# Patient Record
Sex: FEMALE | Race: WHITE | URBAN - METROPOLITAN AREA
[De-identification: names, ages, dates, MRNs, and addresses within clinical notes are randomized per-mention and may not be internally consistent; named-entity substitution may affect disease eponyms.]

---

## 2023-02-24 LAB — ESTRADIOL (PG/ML) IN SER/PLAS: 47 PG/ML

## 2023-02-27 LAB — ESTRADIOL (PG/ML) IN SER/PLAS: 118 PG/ML

## 2023-03-01 LAB — ESTRADIOL (PG/ML) IN SER/PLAS: 278 PG/ML

## 2023-03-03 LAB — ESTRADIOL (PG/ML) IN SER/PLAS: 740 PG/ML

## 2023-06-01 LAB
CHLAMYDIA TRACH., AMPLIFIED: NEGATIVE
HEPATITIS B VIRUS SURFACE AG PRESENCE IN SERUM: NONREACTIVE
HEPATITIS C VIRUS AB PRESENCE IN SERUM: NONREACTIVE
HIV 1/ 2 AG/AB SCREEN: NONREACTIVE
N. GONORRHEA, AMPLIFIED: NEGATIVE
SYPHILIS TOTAL AB: NONREACTIVE
THYROTROPIN (MIU/L) IN SER/PLAS BY DETECTION LIMIT <= 0.05 MIU/L: 2.74 MIU/L (ref 0.44–3.98)

## 2023-06-05 LAB — ANTI-MULLERIAN HORMONE (AMH): 1.24 NG/ML

## 2023-08-30 PROBLEM — Z30.9 CONTRACEPTION MANAGEMENT: Status: ACTIVE | Noted: 2023-08-30

## 2023-08-30 PROBLEM — N97.9 FEMALE INFERTILITY: Status: ACTIVE | Noted: 2023-08-30

## 2023-08-30 PROBLEM — J40 BRONCHITIS: Status: ACTIVE | Noted: 2023-08-30

## 2023-08-30 PROBLEM — S16.1XXA CERVICAL STRAIN: Status: ACTIVE | Noted: 2023-08-30

## 2023-08-30 PROBLEM — J32.9 SINUSITIS: Status: ACTIVE | Noted: 2023-08-30

## 2023-08-30 RX ORDER — PROGESTERONE 100 MG/1
100 CAPSULE ORAL 2 TIMES DAILY
COMMUNITY
Start: 2022-09-09 | End: 2023-11-14 | Stop reason: WASHOUT

## 2023-08-30 RX ORDER — CLOMIPHENE CITRATE 50 MG/1
100 TABLET ORAL
COMMUNITY
Start: 2022-09-09 | End: 2023-11-14 | Stop reason: WASHOUT

## 2023-10-02 ENCOUNTER — TELEPHONE (OUTPATIENT)
Dept: ENDOCRINOLOGY | Facility: CLINIC | Age: 43
End: 2023-10-02
Payer: COMMERCIAL

## 2023-10-02 DIAGNOSIS — N97.9 FEMALE INFERTILITY: ICD-10-CM

## 2023-10-03 ENCOUNTER — SPECIALTY PHARMACY (OUTPATIENT)
Dept: PHARMACY | Facility: CLINIC | Age: 43
End: 2023-10-03

## 2023-10-03 ENCOUNTER — PHARMACY VISIT (OUTPATIENT)
Dept: PHARMACY | Facility: CLINIC | Age: 43
End: 2023-10-03
Payer: COMMERCIAL

## 2023-10-04 ENCOUNTER — PHARMACY VISIT (OUTPATIENT)
Dept: PHARMACY | Facility: CLINIC | Age: 43
End: 2023-10-04
Payer: COMMERCIAL

## 2023-10-04 PROCEDURE — RXMED WILLOW AMBULATORY MEDICATION CHARGE

## 2023-10-06 ENCOUNTER — TELEPHONE (OUTPATIENT)
Dept: ENDOCRINOLOGY | Facility: CLINIC | Age: 43
End: 2023-10-06
Payer: COMMERCIAL

## 2023-10-06 NOTE — TELEPHONE ENCOUNTER
Patient started cycle today, full flow, has questions for the nurse about her injectables. Wants to come in today for her baseline if possible.    Spoke to patient regarding CD#1 today 10/6/23. Patient instructed partner needs STDs completed prior to beginning INJ IUI cycle #2. Patient stated she has FSH at home. Patient instructed to schedule baseline follicle scan and E2 for Monday 10/9/23. Patient instructed to call office later this afternoon to schedule baseline once orders have been signed off.   JENNIFFER SEXTON on 10/6/23 at 8:50 AM.

## 2023-10-09 ENCOUNTER — CLINICAL SUPPORT (OUTPATIENT)
Dept: ENDOCRINOLOGY | Facility: CLINIC | Age: 43
End: 2023-10-09
Payer: COMMERCIAL

## 2023-10-09 DIAGNOSIS — N97.9 FEMALE INFERTILITY: ICD-10-CM

## 2023-10-09 LAB — ESTRADIOL SERPL-MCNC: 60 PG/ML

## 2023-10-09 PROCEDURE — 36415 COLL VENOUS BLD VENIPUNCTURE: CPT

## 2023-10-09 PROCEDURE — 82670 ASSAY OF TOTAL ESTRADIOL: CPT

## 2023-10-09 PROCEDURE — 76857 US EXAM PELVIC LIMITED: CPT

## 2023-10-09 NOTE — PROGRESS NOTES
CYCLING NOTE    Here for US and/or lab monitoring; relevant findings reviewed.    Patient stayed for nurse visit. Pain is 0/10  Team will contact patient later today with results and plan.    Jenniffer Beltran  10/09/2023  8:29 AM    HUDDLE PROVIDER NOTE  Ultrasound and/or labs reviewed at [facility].   Results for orders placed or performed in visit on 10/09/23 (from the past 96 hour(s))   Estradiol   Result Value Ref Range    Estradiol 60 pg/mL     Mon 10/9 (Day 1)   follitropin beta injection: Inject 200 Units once daily in the evening under the skin    Tue 10/10 (Day 2)   follitropin beta injection: Inject 200 Units once daily in the evening under the skin    Wed 10/11 (Day 3)   follitropin beta injection: Inject 200 Units once daily in the evening under the skin      RTC in [facility]: three days for Follicle scan and Estradiol.   Hannah Newby    Spoke with patient regarding above plan. Patient instructed to begin  units subcutaneous tonight 10/9/23. Patient instructed to return to clinic 10/12/23 for follicle scan and E2 level. Patient transferred to [facility] to make appointment.   JENNIFFER BELTRAN on 10/9/23 at 1:57 PM.

## 2023-10-11 ENCOUNTER — TELEPHONE (OUTPATIENT)
Dept: ENDOCRINOLOGY | Facility: CLINIC | Age: 43
End: 2023-10-11
Payer: COMMERCIAL

## 2023-10-11 NOTE — TELEPHONE ENCOUNTER
Patient would like a call back - wanted to get her levels from Monday, said her acupuncturist was asking about them    RN returned patient's phone call. Patient wanted to know her E2 level from Monday. E2=60. Patient also wanted to know if she has ever had an FSH, LH, or ferritin levels drawn. RN searched patient's chart and did not see those orders or resulted in her history. Patient verbalized understanding and will call the office with any other questions or concerns.   Juhi Bermeo RN 10/11/23 12:17 PM

## 2023-10-12 ENCOUNTER — SPECIALTY PHARMACY (OUTPATIENT)
Dept: PHARMACY | Facility: CLINIC | Age: 43
End: 2023-10-12

## 2023-10-12 ENCOUNTER — ANCILLARY PROCEDURE (OUTPATIENT)
Dept: ENDOCRINOLOGY | Facility: CLINIC | Age: 43
End: 2023-10-12
Payer: COMMERCIAL

## 2023-10-12 ENCOUNTER — PHARMACY VISIT (OUTPATIENT)
Dept: PHARMACY | Facility: CLINIC | Age: 43
End: 2023-10-12
Payer: COMMERCIAL

## 2023-10-12 DIAGNOSIS — N97.9 FEMALE INFERTILITY: ICD-10-CM

## 2023-10-12 LAB — ESTRADIOL SERPL-MCNC: 182 PG/ML

## 2023-10-12 PROCEDURE — 82670 ASSAY OF TOTAL ESTRADIOL: CPT

## 2023-10-12 PROCEDURE — 36415 COLL VENOUS BLD VENIPUNCTURE: CPT

## 2023-10-12 PROCEDURE — 76857 US EXAM PELVIC LIMITED: CPT

## 2023-10-12 PROCEDURE — 76857 US EXAM PELVIC LIMITED: CPT | Performed by: OBSTETRICS & GYNECOLOGY

## 2023-10-12 PROCEDURE — RXMED WILLOW AMBULATORY MEDICATION CHARGE

## 2023-10-12 RX ORDER — CHORIOGONADOTROPIN ALFA 250 UG/.5ML
250 INJECTION, SOLUTION SUBCUTANEOUS AS NEEDED
Qty: 0.5 ML | Refills: 1 | Status: SHIPPED | OUTPATIENT
Start: 2023-10-12 | End: 2023-11-14 | Stop reason: WASHOUT

## 2023-10-12 NOTE — PROGRESS NOTES
CYCLING NOTE    Here for US and/or lab monitoring; relevant findings reviewed.  Patient stayed for nurse visit. Pain is 0/10  Team will contact patient later today with results and plan.    Juhi Bermeo  10/12/2023  7:38 AM        HUDDLE PROVIDER NOTE  [facility]   Results for orders placed or performed in visit on 10/12/23 (from the past 96 hour(s))   Estradiol   Result Value Ref Range    Estradiol 182 pg/mL     Thu 10/12 (Day 4)   follitropin beta injection: Inject 200 Units once daily in the evening under the skin    Fri 10/13 (Day 5)   follitropin beta injection: Inject 200 Units once daily in the evening under the skin    RTC in [facility]: two days for Follicle scan and Estradiol.     Payam Beaver MD  Reproductive Endocrinology and Infertility   [facility]   [phone] [place]   [phone] [place]    RN called patient with her plan to continue Follistim at 200 units both tonight and tomorrow night. Patient to return to clinic on Saturday for another monitoring appointment. RN reminded patient to order her trigger injection. Patient told RN she will call the pharmacy as soon as she hangs up. Patient verbalized understanding of her plan, and her appointment was scheduled this morning for Saturday.   Juhi Bermeo RN 10/12/23 2:19 PM

## 2023-10-14 ENCOUNTER — APPOINTMENT (OUTPATIENT)
Dept: LAB | Facility: LAB | Age: 43
End: 2023-10-14
Payer: COMMERCIAL

## 2023-10-14 ENCOUNTER — ANCILLARY PROCEDURE (OUTPATIENT)
Dept: ENDOCRINOLOGY | Facility: CLINIC | Age: 43
End: 2023-10-14

## 2023-10-14 DIAGNOSIS — N97.9 FEMALE INFERTILITY: ICD-10-CM

## 2023-10-14 LAB — ESTRADIOL SERPL-MCNC: 459 PG/ML

## 2023-10-14 PROCEDURE — 36415 COLL VENOUS BLD VENIPUNCTURE: CPT

## 2023-10-14 PROCEDURE — 76857 US EXAM PELVIC LIMITED: CPT

## 2023-10-14 PROCEDURE — 82670 ASSAY OF TOTAL ESTRADIOL: CPT

## 2023-10-14 NOTE — PROGRESS NOTES
CYCLING NOTE    Here for US and/or lab monitoring; relevant findings reviewed.    Patient stayed for nurse visit. Pain is 0/10  Team will contact patient later today with results and plan.    Brenna Beltran  10/14/2023  8:29 AM    HUDDLE PROVIDER NOTE  Ultrasound and/or labs reviewed at huddle.   Results for orders placed or performed in visit on 10/14/23 (from the past 96 hour(s))   Estradiol   Result Value Ref Range    Estradiol 459 pg/mL     Sat 10/14 (Day 6)   follitropin beta injection: Inject 200 Units once daily in the evening under the skin    Sun 10/15 (Day 7)   follitropin beta injection: Inject 200 Units once daily in the evening under the skin    RTC in two days for Follicle scan and Estradiol and Progesterone  Patient contacted with plan by fellow Rissa.   Hannah Newby

## 2023-10-16 ENCOUNTER — TELEPHONE (OUTPATIENT)
Dept: ENDOCRINOLOGY | Facility: CLINIC | Age: 43
End: 2023-10-16

## 2023-10-16 ENCOUNTER — ANCILLARY PROCEDURE (OUTPATIENT)
Dept: ENDOCRINOLOGY | Facility: CLINIC | Age: 43
End: 2023-10-16
Payer: COMMERCIAL

## 2023-10-16 DIAGNOSIS — N97.9 FEMALE INFERTILITY: ICD-10-CM

## 2023-10-16 LAB
ESTRADIOL SERPL-MCNC: 222 PG/ML
LH SERPL-ACNC: 22.5 IU/L
PROGEST SERPL-MCNC: 1.8 NG/ML

## 2023-10-16 PROCEDURE — 36415 COLL VENOUS BLD VENIPUNCTURE: CPT

## 2023-10-16 PROCEDURE — 76857 US EXAM PELVIC LIMITED: CPT

## 2023-10-16 PROCEDURE — 82670 ASSAY OF TOTAL ESTRADIOL: CPT

## 2023-10-16 PROCEDURE — 84144 ASSAY OF PROGESTERONE: CPT

## 2023-10-16 PROCEDURE — 76857 US EXAM PELVIC LIMITED: CPT | Performed by: OBSTETRICS & GYNECOLOGY

## 2023-10-16 PROCEDURE — 83002 ASSAY OF GONADOTROPIN (LH): CPT

## 2023-10-16 NOTE — PROGRESS NOTES
CYCLING NOTE  Here for US and/or lab monitoring; relevant findings reviewed.  Patient stayed for nurse visit. Pain is 0/10 Patient reports she has her trigger at home.   Team will contact patient later today with results and plan.    Juhi Bermeo  10/16/2023  3:10 PM      HUDDLE PROVIDER NOTE  Ultrasound and/or labs reviewed at [facility].   Results for orders placed or performed in visit on 10/16/23 (from the past 96 hour(s))   Estradiol   Result Value Ref Range    Estradiol 222 pg/mL   Progesterone   Result Value Ref Range    Progesterone 1.8 ng/mL   Luteinizing Hormone (LH)   Result Value Ref Range    Luteinizing Hormone 22.5 IU/L     Mon 10/16 (Day 8)   Ovidrel syringe: Inject 250 mcg if needed under the skin    Can add LH today stat and then decide about trigger today versus tomorrow.   Reviewed LH and will trigger today and do IUI tomorrow.   Hannah Newby     RN called patient with her plan. RN reviewed her LH level and why it was drawn. Patient will trigger with Ovidrel as soon as she can. Patient will come to office tomorrow for her IUI. Patient verbalized understanding of her plan. RN transferred patient to the  to schedule her IUI.   Juhi Bermeo RN 10/16/23 3:36 PM

## 2023-10-17 ENCOUNTER — PROCEDURE VISIT (OUTPATIENT)
Dept: ENDOCRINOLOGY | Facility: CLINIC | Age: 43
End: 2023-10-17

## 2023-10-17 DIAGNOSIS — N97.9 FEMALE INFERTILITY: ICD-10-CM

## 2023-10-17 PROCEDURE — 58322 ARTIFICIAL INSEMINATION: CPT | Performed by: NURSE PRACTITIONER

## 2023-10-17 NOTE — PROGRESS NOTES
"Patient ID: Tanya Gaffney is a 42 y.o. female.    Intrauterine Insemination (IUI)    Date/Time: 10/17/2023 9:16 AM    Performed by: MAXIM Murillo  Authorized by: MAXIM Mccloud    Consent:     Consent obtained:  Verbal and written    Consent given by:  Patient    Procedure risks and benefits discussed: yes      Patient questions answered: yes      Patient agrees, verbalizes understanding, and wants to proceed: yes      Educational handouts given: yes      Instructions and paperwork completed: yes    Procedure:     Specimen source: partner      Specimen condition: fresh      Pelvic exam performed: yes      Speculum placed in vagina: yes      Tenaculum applied to cervix: no      Type of insemination: intrauterine insemination      Ultrasound guidance: No      Speculum type: Cristhian      Catheter type: curved      Curvature: mild      Difficulty: easy      Estimated Blood Loss:  None    Specimen Return: none    Post-procedure:     Patient tolerated procedure well: yes      Post-procedure plan: patient counseled on signs and symptoms for which to call and/or return to clinic    Comments:     Procedure comments:  IUI Procedure note:    The patient presented today for IUI.     Consent signed by patient, IUI sample identified by patient, and Final Verification performed with patient via electronic system \"\" prior to insemination.   All patient's questions were discussed and answered.          Chaperone offered to patient for intimate exam: Patient declined chaperone  Name of chaperone: N/A    Specimen Source: Partner  Specimen Condition: Fresh  TMS 5 million    Additional notes:    Patient was advised to call office if she develops fever, chills, pelvic pain, or heavy bleeding.    Progesterone and post-IUI teaching completed. Will start Progesterone three days after IUI and continue twice daily. Pt will do a home pregnancy test two weeks from IUI date. If home pregnancy test positive, patient " will continue Progesterone and call office to schedule BHCG. If home pregnancy test negative, patient will discontinue Progesterone, and was advised to call office with start of menses; will proceed with another cycle if appropriate.  Patient verbalized understanding of plan.    Attending Attestation: I was physically present for key and critical portions performed by the fellow. I reviewed the fellow's documentation and discussed the patient with the fellow. I agree with the fellow's medical decision making as documented in the fellow's note.

## 2023-10-27 ENCOUNTER — TELEMEDICINE (OUTPATIENT)
Dept: ENDOCRINOLOGY | Facility: CLINIC | Age: 43
End: 2023-10-27
Payer: COMMERCIAL

## 2023-10-27 DIAGNOSIS — E03.8 SUBCLINICAL HYPOTHYROIDISM: Primary | ICD-10-CM

## 2023-10-27 PROCEDURE — 99215 OFFICE O/P EST HI 40 MIN: CPT | Performed by: OBSTETRICS & GYNECOLOGY

## 2023-10-27 NOTE — PATIENT INSTRUCTIONS
COUNSELING  We discussed the impact of age on fertility. We discussed that a woman is born with all of the follicles that she will have in her lifetime and that these numbers progressively decrease as the patient reaches menopause. We discussed that women can remain fertile into their late 30’s and even early 40’s, however, chance for success is significantly lower for women who have infertility. We also discussed the higher rates of aneuploidy and miscarriage that occur as women age.    Discussed improved AMH- reassuring that patient may respond to stimulation.  However, age is still primary factor related to IVF prognosis.  Success rate with autologous eggs still expected to be <4%    We again discussed donor egg as an alternative- patient would like to proceed with autologous eggs for now.     Routine Testing  Fertility Center  STDs Within 1 year   Genetic carrier Waiver/Completed   T&S Within 1 year   AMH Within 1 year   TSH Within 1 year   Rubella/Varicella Within 5 years     PLAN  Orders Placed This Encounter   Procedures    TSH with reflex to Free T4 if abnormal       ART Cycle Plan    1. Protocol:  Lead in: estrace  Stimulation protocol: Microdose Flare /Menopur 150  Trigger plan: HCG  Pre-retrieval meds: Antibiotics per protocol  Adjuncts: None  Notes:     2. FET:  Protocol: Programmed Oral estrace and IM P4 75 mg  Adjuncts:  ASA 81 mg  Notes:     3. Insemination:  Sperm source: partner  Sperm collection method: Fresh with Frozen Backup  Notes:  ICSI: Yes  # of oocytes to be fertilized: all    4. Transfer:   Number of embryos to replace: 1 euploid (vs. 3 untested)  Stage of embryo transfer: day 5  Trial transfer needed? No    5. Cryopreservation plan  PGT: PGTA   Freeze all? Yes  Oocyte cryopreservation: No    6. Patient willing to accept blood transfusion: Yes    7. RN to review chart, initiate IVF boarding pass, and assure completion of the following prior to proceeding with IVF stimulation:        Orders Placed This Encounter   Procedures    TSH with reflex to Free T4 if abnormal       STDs (Hepatitis B, Hepatitis C, HIV, Syphilis, GC/CT) for patient and partner (if applicable) to be completed within the last year (z11.3)  Genetic carrier testing: waiver or carrier screen completed with clearance documentation by provider for both patient and partner (z13.71)  Rubella and varicella to be completed within the last five years (z11.59)   TSH to be completed within the last year (z13.29)--> will repeat now, start synthroid 25 mcg if TSH >2.5  Type & Screen to be completed within the last year (z01.83)  AMH to be completed within the last year (z31.41)  Pre-IVF Imaging: Reference any orders placed by provider  Frozen sperm sample: ensure frozen partner sample (z31.41) or verify donor sperm on site prior to stimulation start date.  Verify in EMR or obtain copy of patient’s last mammogram (if applicable) and pap smear results for provider review in boarding pass.  Enroll in Engaged MD and complete annual consent forms for IVF and cryotransport agreements.  BMI checklist for BMI <18 or >40  Consults: Nursing and Financial Consult  Additional consults Financial consult and review what is in the boarding pass.    Margarita Lora MD

## 2023-10-27 NOTE — PROGRESS NOTES
Virtual Visit    Follow Up Visit HPI    Patient is a 42 y.o. No obstetric history on file. 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     Interval history- has made many lifestyle and diet changes (no sugar, no chemicals, whole foods) and has been taking supplements including CoQ10.     Testing to date:   Result Date Done   Type and Screen: No results found for requested labs within last 365 days. No results found for requested labs within last 365 days.   Rh: No results found for requested labs within last 365 days. No results found for requested labs within last 365 days.   Antibody: No results found for requested labs within last 365 days.  No results found for requested labs within last 365 days.   Rubella: No results found for requested labs within last 365 days. 2019   Varicella: No results found for requested labs within last 365 days. 2019   TSH: 2.74 (Ref range: 0.44 - 3.98 mIU/L) 2023   AMH: 1.236 (Ref range: <=6.282 ng/mL) 2023   Hepatitis B surface antigen: NONREACTIVE (Ref range: NONREACTIVE) 2023   Hepatitis C antibody: NONREACTIVE (Ref range: NONREACTIVE) 2023   HIV ½ Antigen Antibody screen with reflex: NONREACTIVE (Ref range: NONREACTIVE) 2023   Syphilis screening with reflex: No results found for requested labs within last 365 days. 2023       IVF Consult reviewed and updated     Patient is a 42 year-old who presents for IVF consult.     :     OB Hx: None     Indication for IVF: ALVARO, AMA     Length of infertility: 3.5 years     Ovarian reserve testing:     AMH: 0.6 ()--> 1.3 2023  AFC: R few, L5 ()--> 4 R, 4 L      Status of fallopian tubes: Patent on HSG 2019     Uterine Cavity Evaluation: HSG shows possible filing defect at fundus (images reviewed); otherwise none  2023- S/p Hysteroscopy polypectomy - Two broad base polyps - one on the anterior wall and one posterior wall. Bilateral ostia  visualized. Otherwise normal cavity contour.       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 LINDY aguilar   Recent IUI sample-  2.1 cc  225 mil/mL  36% motility  TMC=171 million     Past Medical History:   Diagnosis Date    Other specified health status 2019    No pertinent past medical history     History reviewed. No pertinent surgical history.  Current Outpatient Medications on File Prior to Visit   Medication Sig Dispense Refill    choriogonadotropin tam (Ovidrel) 250 mcg/0.5 mL injection Inject 250 mcg (1 syringe) under the skin as a one time dose, as directed per provider for trigger. 0.5 mL 1    clomiPHENE (Clomid) 50 mg tablet Take 2 tablets (100 mg) by mouth once daily. Cycle days 3-7      multivit-min/ferrous fumarate (MULTI VITAMIN ORAL) Take by mouth.      progesterone (Prometrium) 100 mg capsule Take 1 capsule (100 mg) by mouth twice a day. INSERT VAGINALLY STARTING 3 DAYS AFTER INSEMINATION       No current facility-administered medications on file prior to visit.       BMI:   BMI Readings from Last 1 Encounters:   23 19.48 kg/m²     VITALS:  There were no vitals taken for this visit.  LMP: No LMP recorded.    ASSESSMENT   42 y.o. No obstetric history on file. 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    COUNSELING  We discussed the impact of age on fertility. We discussed that a woman is born with all of the follicles that she will have in her lifetime and that these numbers progressively decrease as the patient reaches menopause. We discussed that women can remain fertile into their late 30’s and even early 40’s, however, chance for success is significantly lower for women who have infertility. We also discussed the higher rates of aneuploidy and miscarriage that occur as women age.    Discussed improved AMH- reassuring that patient may respond to stimulation.  However, age is still primary factor related to IVF prognosis.  Success rate with autologous eggs still expected to be <4%    We again discussed donor egg as an alternative- patient would like to proceed with autologous eggs for now.     Routine Testing  Fertility Center  STDs Within 1 year   Genetic carrier Waiver/Completed   T&S Within 1 year   AMH Within 1 year   TSH Within 1 year   Rubella/Varicella Within 5 years     PLAN  Orders Placed This Encounter   Procedures    TSH with reflex to Free T4 if abnormal       ART Cycle Plan    1. Protocol:  Lead in: estrace  Stimulation protocol: Microdose Flare /Menopur 150  Trigger plan: HCG  Pre-retrieval meds: Antibiotics per protocol  Adjuncts:  None  Notes:     2. FET:  Protocol: Programmed Oral estrace and IM P4 75 mg  Adjuncts:  ASA 81 mg  Notes:     3. Insemination:  Sperm source: partner  Sperm collection method: Fresh with Frozen Backup  Notes:  ICSI: Yes  # of oocytes to be fertilized: all    4. Transfer:   Number of embryos to replace: 1 euploid (vs. 3 untested)  Stage of embryo transfer: day 5  Trial transfer needed? No    5. Cryopreservation plan  PGT: PGTA   Freeze all? Yes  Oocyte cryopreservation: No    6. Patient willing to accept blood transfusion: Yes    7. RN to review chart, initiate IVF  boarding pass, and assure completion of the following prior to proceeding with IVF stimulation:       Orders Placed This Encounter   Procedures    TSH with reflex to Free T4 if abnormal       STDs (Hepatitis B, Hepatitis C, HIV, Syphilis, GC/CT) for patient and partner (if applicable) to be completed within the last year (z11.3)  Genetic carrier testing: waiver or carrier screen completed with clearance documentation by provider for both patient and partner (z13.71)  Rubella and varicella to be completed within the last five years (z11.59)   TSH to be completed within the last year (z13.29)--> will repeat now, start synthroid 25 mcg if TSH >2.5  Type & Screen to be completed within the last year (z01.83)  AMH to be completed within the last year (z31.41)  Pre-IVF Imaging: Reference any orders placed by provider  Frozen sperm sample: ensure frozen partner sample (z31.41) or verify donor sperm on site prior to stimulation start date.  Verify in EMR or obtain copy of patient’s last mammogram (if applicable) and pap smear results for provider review in boarding pass.  Enroll in Engaged MD and complete annual consent forms for IVF and cryotransport agreements.  BMI checklist for BMI <18 or >40  Consults: Nursing and Financial Consult  Additional consults Financial consult and review what is in the boarding pass.    Margarita Lora MD

## 2023-10-31 ENCOUNTER — TELEPHONE (OUTPATIENT)
Dept: ENDOCRINOLOGY | Facility: CLINIC | Age: 43
End: 2023-10-31
Payer: COMMERCIAL

## 2023-10-31 DIAGNOSIS — Z31.83 ENCOUNTER FOR ASSISTED REPRODUCTIVE FERTILITY CYCLE: ICD-10-CM

## 2023-10-31 DIAGNOSIS — Z13.1 SCREENING FOR DIABETES MELLITUS: ICD-10-CM

## 2023-10-31 DIAGNOSIS — N97.9 FEMALE INFERTILITY: ICD-10-CM

## 2023-10-31 DIAGNOSIS — Z01.812 PRE-PROCEDURE LAB EXAM: ICD-10-CM

## 2023-10-31 DIAGNOSIS — Z01.83 ENCOUNTER FOR RH BLOOD TYPING: ICD-10-CM

## 2023-10-31 NOTE — PROGRESS NOTES
Boarding Pass IVF Checklist    Age: 42 y.o.  No obstetric history on file.    Provider: Margarita Lora MD  Primary RN: Brenna Beltran  Reasons for Treatment: Diminished Ovarian Reserve and AMA  Last BMI  23 : 19.48 kg/m²       Past Medical History:   Diagnosis Date    Other specified health status 2019    No pertinent past medical history     Ectopic Risk: N    Date Done Consultation Results/Comments   *** Medication Protocol Lead in: {AVELINA LEAD IN:23640}  Stimulation protocol: ***  Trigger plan: {AVELINA TRIGGER PLAN:42596}  Pre-retrieval meds: Antibiotics per protocol  Adjuncts: {AVELINA ADJUNCTS:23371}  Notes: ***   10/27/23 IVF Consult   IVF Consult: Yes  PGT-A/M? Yes; Req Sent: {AVELINA YES/NO:11485}; PGT-M Test Ready: {AVELINA YES/NO:}; Company: [employer]    Emailed 23 ES   *** IVF Consent Form Enroll in EngagedMD: Yes (Brenna Beltran RN)  Received and in chart: {AVELINA Yes (Name):16196}   *** UH Waiver (out) Form Enroll in EngagedMD: {AVELINA Yes (Name):05434}  Received and in chart: {AVELINA Yes (Name):51606}   *** [employer] Transfer Authorization Form Enroll in EngagedMD: Yes (Brenna Beltran RN)  Received and in chart: {AVELINA Yes (Name) N/A:81120}    Insurance Procedure Order Placed? N/A  Pended    *** Financial Consult {AVELINA YES/NO:04726}   *** PAT Questionnaire  {Anesthesia consult?:68353}   *** Nursing Teaching: {AVELINA Yes (Name):26762}  Cycle Calendar: {AVELINA Yes (Name):12375}  SART: {AVELINA Yes (Name):83916}   *** Genetic Carrier Screening Clearance {AVELINA Y or N:50255}    MFM Consult Okay to proceed? N/A   N/A Psych Consult Okay to proceed? N/A   N/A Genetics Consult Okay to proceed? N/A    Other    Date Done Female Labs Results/Comments   11/3/2023 T&S (Q 1 Year) ABO: O  Rh: POS  Antibody: No results found for requested labs within last 365 days.   2023 Hep B sAg NONREACTIVE   2023 Hep C AB NONREACTIVE   2023 HIV NONREACTIVE   2023 Syphilis NONREACTIVE   2023 GC/CT GC:  NEGATIVE  CT: NEGATIVE   12/6/2019 Rubella (Q 5 Years) 137.0   12/6/2019 Varicella (Q 5 Years) POSITIVE   11/3/2023 TSH 1.74 (Ref range: 0.44 - 3.98 mIU/L)   6/14/2022 HgbA1C 4.7   6/1/2023 AMH 1.236   12/6/2019 Carrier Screening Myriad 2bP: N/A  Authorization completed  Pos Mucopolysaccharidosis Type IIIA    Uterine Cavity Eval HSG: N/A    SIS: N/A    Hyster: (N/A)     N/A Trial Transfer Needs at retrieval? {AVELINA YES/NO:20759}  Easy IUIs: {AVELINA YES/NO:20759}   6/15/23 Pap Smear (Q 5 Years) WNL   HPV: N/A    Mammogram ( > 40) (Q 1 Year)           Date Done Male Labs  FIOR GEIGER (MRN: [id number]) Results/Comments   10/6/2023 Hep B sAg Nonreactive   10/6/2023 Hep C AB  Nonreactive   10/6/2023 HIV Nonreactive   10/6/2023 Syphilis Nonreactive   10/6/2023 GC/CT GC: Negative  CT: Negative   6/23/2022 Carrier Screening N/A  Authorization completed  Pos Phenylalanine Hydroxylase Deficiency and pendred syndrome    N/A Semen Analysis  Volume(mL): N/A  Count(million): N/A  Motility(%): N/A  Motile Count(million): N/A   *** Sperm Freeze  # of vials: N/A  TMS post thaw: N/A   Date Done Miscellaneous Results/Comments   N/A BMI Checklist  BMI > 40 or < 18 Added to chart:   No   N/A >= 45 Checklist  Added to chart:   No   **Does not need to be completed prior to placing on IVF calendar**      Boarding Pass 45 and Older Checklist    Date Done Testing Results   No results found for requested labs within last 365 days. CBC Plt: No results found for requested labs within last 365 days.  Hct: No results found for requested labs within last 365 days.   No results found for requested labs within last 365 days. CMP BUN: No results found for requested labs within last 365 days.  Cre: No results found for requested labs within last 365 days.  AST: No results found for requested labs within last 365 days.  ALT: No results found for requested labs within last 365 days.   No results found for requested labs within last 365 days. Lipid Panel  Cholesterol: No results found for requested labs within last 365 days.  HDL: No results found for requested labs within last 365 days.  Cholesterol/HDL Ratio: No results found for requested labs within last 365 days.  LDL: No results found for requested labs within last 365 days.  VLDL: No results found for requested labs within last 365 days.  Triglycerides: No results found for requested labs within last 365 days.  Non HDL Cholesterol: No results found for requested labs within last 365 days.   No results found for requested labs within last 365 days. HgbA1C No results found for requested labs within last 365 days.   11/3/2023 TSH (with reflex to T4) TSH: 1.74 (Ref range: 0.44 - 3.98 mIU/L)  T4: No results found for requested labs within last 365 days.   N/A EKG N/A   N/A Mammogram ( > 40) (Q 1 Year) N/A  {AVELINA Declined - provider documentation:44925}   *** MFM Clearance Clearance Letter-Provider Reviewed: ***   *** PCP/Internal Medicine Clearance (if required) Clearance Letter-Provider Reviewed: ***   N/A Colonoscopy No results found for this or any previous visit.   *** Transfer of Care (when pregnant) {Transfer of Care (when pregnant):11020}

## 2023-11-01 NOTE — PROGRESS NOTES
Spoke with patient regarding IVF checklist. Patient would like additional labs drawn such as, LH, E2, FSH, Ferritin, Vit-D and B12 levels drawn. Patient instructed this RN will contact Dr. Lora regarding additional testing and >45 checklist items. Patient stated she has a mammogram coming up. Patient instructed to call office tomorrow to schedule sperm freeze. Patient instructed to complete PAT questionnaire and engaged MD forms.   JENNIFFER SEXTON on 11/1/23 at 2:09 PM.

## 2023-11-01 NOTE — TELEPHONE ENCOUNTER
Attempted to return patient call regarding IVF checklist. Detialed VM left for patient. Patient instructed she will need a repeat TSH and an updated type and screen. Patient instructed she will need and updated mammogram. Patient instructed partner will need semen freeze. Patient instructed forms/consents were sent via engaged MD. Patient instructed PAT form was sent via my chart. Patient instructed we have a lab closure around the holidays and the last day for a fresh IVF start would be 11/25/23. Patient instructed we will most likely be looking at an IVF start in the new year and patient will have a birthday before that and will be turning 43 and will need additional checklist items completed. Patient instructed to call office with questions/concerns.   JENNIFFER SEXTON on 11/1/23 at 11:11 AM.

## 2023-11-02 DIAGNOSIS — Z31.69 ENCOUNTER FOR PRECONCEPTION CONSULTATION: ICD-10-CM

## 2023-11-02 DIAGNOSIS — E56.9 VITAMIN DEFICIENCY: Primary | ICD-10-CM

## 2023-11-02 DIAGNOSIS — Z31.41 FERTILITY TESTING: ICD-10-CM

## 2023-11-03 ENCOUNTER — LAB (OUTPATIENT)
Dept: LAB | Facility: LAB | Age: 43
End: 2023-11-03
Payer: COMMERCIAL

## 2023-11-03 ENCOUNTER — APPOINTMENT (OUTPATIENT)
Dept: LAB | Facility: LAB | Age: 43
End: 2023-11-03
Payer: COMMERCIAL

## 2023-11-03 DIAGNOSIS — Z01.83 ENCOUNTER FOR RH BLOOD TYPING: ICD-10-CM

## 2023-11-03 DIAGNOSIS — Z31.41 FERTILITY TESTING: ICD-10-CM

## 2023-11-03 DIAGNOSIS — E56.9 VITAMIN DEFICIENCY: ICD-10-CM

## 2023-11-03 DIAGNOSIS — E03.8 SUBCLINICAL HYPOTHYROIDISM: ICD-10-CM

## 2023-11-03 LAB
25(OH)D3 SERPL-MCNC: 62 NG/ML (ref 30–100)
ABO GROUP (TYPE) IN BLOOD: NORMAL
ANTIBODY SCREEN: NORMAL
ESTRADIOL SERPL-MCNC: 37 PG/ML
FERRITIN SERPL-MCNC: 151 NG/ML (ref 8–150)
FSH SERPL-ACNC: 9.6 IU/L
LH SERPL-ACNC: 12.4 IU/L
RH FACTOR (ANTIGEN D): NORMAL
TSH SERPL-ACNC: 1.74 MIU/L (ref 0.44–3.98)
VIT B12 SERPL-MCNC: 807 PG/ML (ref 211–911)

## 2023-11-03 PROCEDURE — 86901 BLOOD TYPING SEROLOGIC RH(D): CPT

## 2023-11-03 PROCEDURE — 82306 VITAMIN D 25 HYDROXY: CPT

## 2023-11-03 PROCEDURE — 86900 BLOOD TYPING SEROLOGIC ABO: CPT

## 2023-11-03 PROCEDURE — 82728 ASSAY OF FERRITIN: CPT

## 2023-11-03 PROCEDURE — 84443 ASSAY THYROID STIM HORMONE: CPT

## 2023-11-03 PROCEDURE — 82670 ASSAY OF TOTAL ESTRADIOL: CPT

## 2023-11-03 PROCEDURE — 83002 ASSAY OF GONADOTROPIN (LH): CPT

## 2023-11-03 PROCEDURE — 82607 VITAMIN B-12: CPT

## 2023-11-03 PROCEDURE — 36415 COLL VENOUS BLD VENIPUNCTURE: CPT

## 2023-11-03 PROCEDURE — 86850 RBC ANTIBODY SCREEN: CPT

## 2023-11-03 PROCEDURE — 83001 ASSAY OF GONADOTROPIN (FSH): CPT

## 2023-11-03 NOTE — PROGRESS NOTES
Attempted to reach patient regarding additional lab work and testing. Detailed VM left for patient. Patient instructed additional lab work has been ordered. Patient instructed EKG was ordered. Patient instructed MFM consult place. Patient instructed to reach out to PCP for clearance. Patient instructed per Dr. Lora she can decline colonscopy if she wishes.   JENNIFFER SEXTON on 11/3/23 at 8:05 AM.

## 2023-11-12 PROBLEM — Z01.419 WELL FEMALE EXAM WITH ROUTINE GYNECOLOGICAL EXAM: Status: ACTIVE | Noted: 2023-08-30

## 2023-11-12 PROBLEM — Z12.31 VISIT FOR SCREENING MAMMOGRAM: Status: ACTIVE | Noted: 2023-08-30

## 2023-11-12 PROBLEM — N60.01 BREAST CYST, RIGHT: Status: ACTIVE | Noted: 2023-11-12

## 2023-11-12 NOTE — PROGRESS NOTES
"Annual  Subjective   Tanya Gaffney is a 42 y.o. female, last seen 06/2022, who is here for a routine exam.  Interval med hx: melanoma removed from underside of left breast, stitches still in place. Recently completed IUI in Oct 2023, not resulting in pregnancy. Targeting to begin IVF in Jan. 2024. Recent lab work has shown fluctuating TSH and high ferritin levels, so Dr. Lora is going to repeat blood work.   Complaints: none at this time  Last pap 06/2022 neg/hpv neg   Mammogram 06/22 abnl R screen; dx =12mm cyst w 11/22 rpt 10mm cyst=resume routine   Birth control Pt is trying to conceive and is seeing a fertility Specialist ( Dr. Margarita Chino ).   Periods : every month, NORMAL BLOOD LOSS.   Menarche [age].   Date of Last Period STDs none.   Sexual activity currently sexually active;same long term partner; no exposure concerns.   Total pregnancies  0.    Review of Systems   Constitutional:  Negative for activity change, fatigue and unexpected weight change.   Respiratory:  Negative for chest tightness and shortness of breath.    Cardiovascular:  Negative for chest pain and leg swelling.   Gastrointestinal:  Negative for abdominal distention and abdominal pain.   Genitourinary:  Negative for difficulty urinating, dysuria, genital sores, pelvic pain, vaginal bleeding, vaginal discharge and vaginal pain.   Musculoskeletal:  Negative for gait problem and joint swelling.   Skin:  Negative for color change and rash.   Neurological:  Negative for dizziness, weakness and headaches.   Hematological:  Negative for adenopathy.   Objective  Visit Vitals  /62   Ht 1.651 m (5' 5\")   Wt 55.8 kg (123 lb)   LMP 11/01/2023   BMI 20.47 kg/m²   OB Status Having periods   Smoking Status Never   BSA 1.6 m²       General:   Alert and oriented, in no acute distress   Neck: Supple. No visible thyromegaly.    Breast/Axilla: Normal to palpation bilaterally without masses, skin changes, or nipple discharge. Tissue very dense; " bandage underside L breast remains in place at site melanoma excison.   Abdomen: Soft, non-tender, without masses or organomegaly   Vulva: Normal architecture without erythema, masses, or lesions.    Vagina: Normal mucosa without lesions, masses, or atrophy. No abnormal vaginal discharge.    Cervix: Normal without masses, lesions, or signs of cervicitis   Uterus: Normal, mobile, non-enlarged uterus   Adnexa: No palp masses or tenderness   Pelvic Floor normal   Psych Normal affect. Normal mood.      Assessment/Plan   Encounter Diagnoses   Name Primary?    Well female exam with routine gynecological exam; grossly nl breast and gyn exams. Yes    Visit for screening mammogram; will sched after left breast heals from lesion excision     Breast cyst, right; stable on serial scans; resume routine per prad     Female infertility; IVF planned to start 01/01/2024.      Buffy Bojorquez MD

## 2023-11-13 ASSESSMENT — ENCOUNTER SYMPTOMS
CHEST TIGHTNESS: 0
ABDOMINAL PAIN: 0
WEAKNESS: 0
FATIGUE: 0
HEADACHES: 0
ADENOPATHY: 0
COLOR CHANGE: 0
UNEXPECTED WEIGHT CHANGE: 0
SHORTNESS OF BREATH: 0
JOINT SWELLING: 0
DYSURIA: 0
DIFFICULTY URINATING: 0
ACTIVITY CHANGE: 0
ABDOMINAL DISTENTION: 0
DIZZINESS: 0

## 2023-11-14 ENCOUNTER — TELEPHONE (OUTPATIENT)
Dept: ENDOCRINOLOGY | Facility: CLINIC | Age: 43
End: 2023-11-14
Payer: COMMERCIAL

## 2023-11-14 ENCOUNTER — OFFICE VISIT (OUTPATIENT)
Dept: OBSTETRICS AND GYNECOLOGY | Facility: CLINIC | Age: 43
End: 2023-11-14
Payer: COMMERCIAL

## 2023-11-14 VITALS
DIASTOLIC BLOOD PRESSURE: 62 MMHG | BODY MASS INDEX: 20.49 KG/M2 | HEIGHT: 65 IN | SYSTOLIC BLOOD PRESSURE: 100 MMHG | WEIGHT: 123 LBS

## 2023-11-14 DIAGNOSIS — Z12.31 VISIT FOR SCREENING MAMMOGRAM: ICD-10-CM

## 2023-11-14 DIAGNOSIS — N60.01 BREAST CYST, RIGHT: ICD-10-CM

## 2023-11-14 DIAGNOSIS — N97.9 FEMALE INFERTILITY: ICD-10-CM

## 2023-11-14 DIAGNOSIS — Z01.419 WELL FEMALE EXAM WITH ROUTINE GYNECOLOGICAL EXAM: Primary | ICD-10-CM

## 2023-11-14 PROCEDURE — 1036F TOBACCO NON-USER: CPT | Performed by: OBSTETRICS & GYNECOLOGY

## 2023-11-14 PROCEDURE — 99396 PREV VISIT EST AGE 40-64: CPT | Performed by: OBSTETRICS & GYNECOLOGY

## 2023-11-14 ASSESSMENT — PATIENT HEALTH QUESTIONNAIRE - PHQ9
1. LITTLE INTEREST OR PLEASURE IN DOING THINGS: NOT AT ALL
2. FEELING DOWN, DEPRESSED OR HOPELESS: NOT AT ALL
SUM OF ALL RESPONSES TO PHQ9 QUESTIONS 1 & 2: 0

## 2023-11-14 ASSESSMENT — LIFESTYLE VARIABLES
SKIP TO QUESTIONS 9-10: 1
HOW OFTEN DO YOU HAVE SIX OR MORE DRINKS ON ONE OCCASION: NEVER
HOW MANY STANDARD DRINKS CONTAINING ALCOHOL DO YOU HAVE ON A TYPICAL DAY: 1 OR 2
AUDIT-C TOTAL SCORE: 1
HOW OFTEN DO YOU HAVE A DRINK CONTAINING ALCOHOL: MONTHLY OR LESS

## 2023-11-14 NOTE — TELEPHONE ENCOUNTER
Returned patient call regarding reviewing lab work. Patient instructed that additional testing was not ordered (CMP, CBC, Lipid panel, hemoglobin A1C) and she will need to have these levels drawn. Patient instructed this RN will send message to Dr. Lora regarding review of lab work as patient is concerned about Ferritin level and is wondering if Dr. Lora would recommend additional testing such as a liver panel. Patient is also concerned about TSH and would like to know if she should have additional testing for hashimoto's and start thyroid medication. Patient instructed that in the fertility world we like the level to be below 2.5. Patient instructed this RN will send message to Dr. Lora so that she can reach out to the patient. Patient agreeable.   JENNIFFER SEXTON on 11/14/23 at 2:27 PM.

## 2023-11-16 ENCOUNTER — OFFICE VISIT (OUTPATIENT)
Dept: PRIMARY CARE | Facility: CLINIC | Age: 43
End: 2023-11-16
Payer: COMMERCIAL

## 2023-11-16 VITALS
WEIGHT: 124 LBS | HEIGHT: 66 IN | OXYGEN SATURATION: 99 % | SYSTOLIC BLOOD PRESSURE: 116 MMHG | BODY MASS INDEX: 19.93 KG/M2 | DIASTOLIC BLOOD PRESSURE: 68 MMHG | HEART RATE: 82 BPM

## 2023-11-16 DIAGNOSIS — Z83.49 FAMILY HISTORY OF HASHIMOTO THYROIDITIS: ICD-10-CM

## 2023-11-16 DIAGNOSIS — Z00.00 ANNUAL PHYSICAL EXAM: Primary | ICD-10-CM

## 2023-11-16 DIAGNOSIS — N97.9 FEMALE INFERTILITY: ICD-10-CM

## 2023-11-16 DIAGNOSIS — R53.83 FATIGUE, UNSPECIFIED TYPE: ICD-10-CM

## 2023-11-16 DIAGNOSIS — E04.9 ENLARGED THYROID: ICD-10-CM

## 2023-11-16 DIAGNOSIS — Z98.890 HX OF MELANOMA EXCISION: ICD-10-CM

## 2023-11-16 DIAGNOSIS — Z85.820 HX OF MELANOMA EXCISION: ICD-10-CM

## 2023-11-16 DIAGNOSIS — Z76.89 ENCOUNTER TO ESTABLISH CARE: ICD-10-CM

## 2023-11-16 PROBLEM — J40 BRONCHITIS: Status: RESOLVED | Noted: 2023-08-30 | Resolved: 2023-11-16

## 2023-11-16 PROBLEM — J32.9 SINUSITIS: Status: RESOLVED | Noted: 2023-08-30 | Resolved: 2023-11-16

## 2023-11-16 PROBLEM — S16.1XXA CERVICAL STRAIN: Status: RESOLVED | Noted: 2023-08-30 | Resolved: 2023-11-16

## 2023-11-16 PROBLEM — Z12.31 VISIT FOR SCREENING MAMMOGRAM: Status: RESOLVED | Noted: 2023-08-30 | Resolved: 2023-11-16

## 2023-11-16 PROCEDURE — 99386 PREV VISIT NEW AGE 40-64: CPT

## 2023-11-16 PROCEDURE — 1036F TOBACCO NON-USER: CPT

## 2023-11-16 ASSESSMENT — ENCOUNTER SYMPTOMS
LIGHT-HEADEDNESS: 0
SHORTNESS OF BREATH: 0
NAUSEA: 0
ADENOPATHY: 0
DIFFICULTY URINATING: 0
VOMITING: 0
FATIGUE: 1
MYALGIAS: 0
DYSURIA: 0
HEMATURIA: 0
DIARRHEA: 0
FEVER: 0
DIAPHORESIS: 0
BLOOD IN STOOL: 0
WHEEZING: 0
SORE THROAT: 0
ARTHRALGIAS: 0
TREMORS: 0
PALPITATIONS: 0
COUGH: 0

## 2023-11-16 ASSESSMENT — PATIENT HEALTH QUESTIONNAIRE - PHQ9
SUM OF ALL RESPONSES TO PHQ9 QUESTIONS 1 AND 2: 0
1. LITTLE INTEREST OR PLEASURE IN DOING THINGS: NOT AT ALL
2. FEELING DOWN, DEPRESSED OR HOPELESS: NOT AT ALL

## 2023-11-16 ASSESSMENT — PAIN SCALES - GENERAL: PAINLEVEL: 2

## 2023-11-16 NOTE — PROGRESS NOTES
"Subjective   Patient ID: Tanya Gaffney is a 42 y.o. female who presents for Establish Care (Pt is here to establish care and physical and discuss thyroid issues /la).    Hx melanoma (removed 2023), infertility    Other providers: Dr. Bojorquez (Ob/gyn), Dr. Margarita Lora (fertility specialist, planning on IVF), Dr. Ramirez (dermatologist), acupuncturist    Thyroid concerns: Mother has hashimoto's. +fatigue. Wants to make sure thyroid is normal before IVF. Denies hair loss, heat/cold intolerance, constipation/diarrhea, menstrual problems, heart palpitations, tremors.     FHx reviewed.     HM: PAP UTD 6/2022. Mammogram order placed by Ob/gyn. Colon screening start 43. Lung screening never smoker. DEXA start at 65. Vaccinations declines flu, will wait to discuss Tdap with fertility specialist.           Review of Systems   Constitutional:  Positive for fatigue. Negative for diaphoresis and fever.   HENT:  Negative for congestion, hearing loss and sore throat.    Eyes:  Negative for visual disturbance.   Respiratory:  Negative for cough, shortness of breath and wheezing.    Cardiovascular:  Negative for chest pain, palpitations and leg swelling.   Gastrointestinal:  Negative for blood in stool, diarrhea, nausea and vomiting.   Endocrine: Negative for cold intolerance and heat intolerance.   Genitourinary:  Negative for difficulty urinating, dysuria, hematuria and menstrual problem.   Musculoskeletal:  Negative for arthralgias and myalgias.   Skin:  Negative for rash.   Allergic/Immunologic: Negative for immunocompromised state.   Neurological:  Negative for tremors, syncope and light-headedness.   Hematological:  Negative for adenopathy.   Psychiatric/Behavioral:  Negative for suicidal ideas.        Objective   /68   Pulse 82   Ht 1.676 m (5' 6\")   Wt 56.2 kg (124 lb)   LMP 11/01/2023   SpO2 99%   BMI 20.01 kg/m²     Physical Exam  Constitutional:       General: She is not in acute distress.     Appearance: " Normal appearance.   HENT:      Right Ear: Tympanic membrane and ear canal normal.      Left Ear: Tympanic membrane and ear canal normal.      Nose: Nose normal.      Mouth/Throat:      Mouth: Mucous membranes are moist.      Pharynx: Oropharynx is clear. No oropharyngeal exudate or posterior oropharyngeal erythema.   Eyes:      Extraocular Movements: Extraocular movements intact.      Conjunctiva/sclera: Conjunctivae normal.      Pupils: Pupils are equal, round, and reactive to light.   Neck:      Thyroid: Thyromegaly present. No thyroid mass or thyroid tenderness.   Cardiovascular:      Rate and Rhythm: Normal rate and regular rhythm.      Pulses: Normal pulses.           Posterior tibial pulses are 2+ on the right side and 2+ on the left side.      Heart sounds: No murmur heard.     No gallop.   Pulmonary:      Effort: Pulmonary effort is normal.      Breath sounds: No wheezing, rhonchi or rales.   Abdominal:      General: Bowel sounds are normal.      Palpations: Abdomen is soft. There is no hepatomegaly, splenomegaly or mass.      Tenderness: There is no abdominal tenderness. There is no guarding.   Musculoskeletal:         General: Normal range of motion.   Lymphadenopathy:      Cervical: No cervical adenopathy.   Skin:     General: Skin is warm.      Findings: No rash.   Neurological:      General: No focal deficit present.      Mental Status: She is alert.   Psychiatric:         Mood and Affect: Mood normal.         Thought Content: Thought content normal.       Assessment/Plan   Diagnoses and all orders for this visit:  Annual physical exam  Encounter to establish care  Enlarged thyroid  -     Thyroid Peroxidase (TPO) Antibody; Future  -     Anti-Thyroglobulin Antibody; Future  -     Triiodothyronine, Free; Future  -     TSH; Future  -     US thyroid; Future  Fatigue, unspecified type  -     Thyroid Peroxidase (TPO) Antibody; Future  -     Anti-Thyroglobulin Antibody; Future  -     Triiodothyronine, Free;  Future  -     TSH; Future  -     US thyroid; Future  Family history of Hashimoto thyroiditis  -     Thyroid Peroxidase (TPO) Antibody; Future  -     Anti-Thyroglobulin Antibody; Future  -     Triiodothyronine, Free; Future  -     TSH; Future  -     US thyroid; Future  Female infertility  Hx of melanoma excision

## 2023-11-20 ENCOUNTER — TELEPHONE (OUTPATIENT)
Dept: PRIMARY CARE | Facility: CLINIC | Age: 43
End: 2023-11-20
Payer: COMMERCIAL

## 2023-11-20 DIAGNOSIS — N97.9 FEMALE INFERTILITY: Primary | ICD-10-CM

## 2023-11-20 DIAGNOSIS — R53.83 FATIGUE, UNSPECIFIED TYPE: ICD-10-CM

## 2023-11-20 NOTE — TELEPHONE ENCOUNTER
Pt called stating that she would like to have two lab orders added to her blood work. Pt states that her fertility doctor recommended pt to have PCP add these labs. Pt would like to have an Iron panel and Liver panel added. Pt would like a call when orders have been placed. Pt last seen on 11/16/2023.

## 2023-11-21 ENCOUNTER — LAB (OUTPATIENT)
Dept: LAB | Facility: LAB | Age: 43
End: 2023-11-21
Payer: COMMERCIAL

## 2023-11-21 ENCOUNTER — ANCILLARY PROCEDURE (OUTPATIENT)
Dept: RADIOLOGY | Facility: CLINIC | Age: 43
End: 2023-11-21
Payer: COMMERCIAL

## 2023-11-21 DIAGNOSIS — Z01.812 PRE-PROCEDURE LAB EXAM: ICD-10-CM

## 2023-11-21 DIAGNOSIS — Z83.49 FAMILY HISTORY OF HASHIMOTO THYROIDITIS: ICD-10-CM

## 2023-11-21 DIAGNOSIS — E04.9 ENLARGED THYROID: ICD-10-CM

## 2023-11-21 DIAGNOSIS — N97.9 FEMALE INFERTILITY: ICD-10-CM

## 2023-11-21 DIAGNOSIS — R53.83 FATIGUE, UNSPECIFIED TYPE: ICD-10-CM

## 2023-11-21 DIAGNOSIS — Z13.1 SCREENING FOR DIABETES MELLITUS: ICD-10-CM

## 2023-11-21 LAB
ALBUMIN SERPL BCP-MCNC: 4.7 G/DL (ref 3.4–5)
ALP SERPL-CCNC: 55 U/L (ref 33–110)
ALT SERPL W P-5'-P-CCNC: 9 U/L (ref 7–45)
ANION GAP SERPL CALC-SCNC: 14 MMOL/L (ref 10–20)
AST SERPL W P-5'-P-CCNC: 13 U/L (ref 9–39)
BILIRUB DIRECT SERPL-MCNC: 0.2 MG/DL (ref 0–0.3)
BILIRUB SERPL-MCNC: 1.3 MG/DL (ref 0–1.2)
BUN SERPL-MCNC: 13 MG/DL (ref 6–23)
CALCIUM SERPL-MCNC: 9.4 MG/DL (ref 8.6–10.6)
CHLORIDE SERPL-SCNC: 101 MMOL/L (ref 98–107)
CHOLEST SERPL-MCNC: 273 MG/DL (ref 0–199)
CHOLESTEROL/HDL RATIO: 3.4
CO2 SERPL-SCNC: 26 MMOL/L (ref 21–32)
CREAT SERPL-MCNC: 0.62 MG/DL (ref 0.5–1.05)
ERYTHROCYTE [DISTWIDTH] IN BLOOD BY AUTOMATED COUNT: 11.8 % (ref 11.5–14.5)
EST. AVERAGE GLUCOSE BLD GHB EST-MCNC: 82 MG/DL
GFR SERPL CREATININE-BSD FRML MDRD: >90 ML/MIN/1.73M*2
GLUCOSE SERPL-MCNC: 81 MG/DL (ref 74–99)
HBA1C MFR BLD: 4.5 %
HCT VFR BLD AUTO: 39.7 % (ref 36–46)
HDLC SERPL-MCNC: 80.1 MG/DL
HGB BLD-MCNC: 13.9 G/DL (ref 12–16)
IRON SATN MFR SERPL: 52 % (ref 25–45)
IRON SERPL-MCNC: 147 UG/DL (ref 35–150)
LDLC SERPL CALC-MCNC: 177 MG/DL
MCH RBC QN AUTO: 32.5 PG (ref 26–34)
MCHC RBC AUTO-ENTMCNC: 35 G/DL (ref 32–36)
MCV RBC AUTO: 93 FL (ref 80–100)
NON HDL CHOLESTEROL: 193 MG/DL (ref 0–149)
NRBC BLD-RTO: 0 /100 WBCS (ref 0–0)
PLATELET # BLD AUTO: 270 X10*3/UL (ref 150–450)
POTASSIUM SERPL-SCNC: 3.7 MMOL/L (ref 3.5–5.3)
PROT SERPL-MCNC: 7.2 G/DL (ref 6.4–8.2)
RBC # BLD AUTO: 4.28 X10*6/UL (ref 4–5.2)
SODIUM SERPL-SCNC: 137 MMOL/L (ref 136–145)
T3FREE SERPL-MCNC: 3.9 PG/ML (ref 2.3–4.2)
THYROPEROXIDASE AB SERPL-ACNC: <28 IU/ML
TIBC SERPL-MCNC: 284 UG/DL (ref 240–445)
TRIGL SERPL-MCNC: 79 MG/DL (ref 0–149)
TSH SERPL-ACNC: 1.88 MIU/L (ref 0.44–3.98)
UIBC SERPL-MCNC: 137 UG/DL (ref 110–370)
VLDL: 16 MG/DL (ref 0–40)
WBC # BLD AUTO: 4.9 X10*3/UL (ref 4.4–11.3)

## 2023-11-21 PROCEDURE — 36415 COLL VENOUS BLD VENIPUNCTURE: CPT

## 2023-11-21 PROCEDURE — 83036 HEMOGLOBIN GLYCOSYLATED A1C: CPT

## 2023-11-21 PROCEDURE — 82248 BILIRUBIN DIRECT: CPT

## 2023-11-21 PROCEDURE — 86376 MICROSOMAL ANTIBODY EACH: CPT

## 2023-11-21 PROCEDURE — 83540 ASSAY OF IRON: CPT

## 2023-11-21 PROCEDURE — 76536 US EXAM OF HEAD AND NECK: CPT | Performed by: STUDENT IN AN ORGANIZED HEALTH CARE EDUCATION/TRAINING PROGRAM

## 2023-11-21 PROCEDURE — 84481 FREE ASSAY (FT-3): CPT

## 2023-11-21 PROCEDURE — 86800 THYROGLOBULIN ANTIBODY: CPT

## 2023-11-21 PROCEDURE — 85027 COMPLETE CBC AUTOMATED: CPT

## 2023-11-21 PROCEDURE — 80053 COMPREHEN METABOLIC PANEL: CPT

## 2023-11-21 PROCEDURE — 80061 LIPID PANEL: CPT

## 2023-11-21 PROCEDURE — 76536 US EXAM OF HEAD AND NECK: CPT

## 2023-11-21 PROCEDURE — 83550 IRON BINDING TEST: CPT

## 2023-11-21 PROCEDURE — 84443 ASSAY THYROID STIM HORMONE: CPT

## 2023-11-22 LAB — THYROGLOB AB SERPL-ACNC: <0.9 IU/ML (ref 0–4)

## 2023-12-18 ENCOUNTER — ANCILLARY PROCEDURE (OUTPATIENT)
Dept: RADIOLOGY | Facility: CLINIC | Age: 43
End: 2023-12-18
Payer: COMMERCIAL

## 2023-12-18 VITALS — BODY MASS INDEX: 19.77 KG/M2 | HEIGHT: 66 IN | WEIGHT: 123 LBS

## 2023-12-18 DIAGNOSIS — Z12.31 VISIT FOR SCREENING MAMMOGRAM: ICD-10-CM

## 2023-12-18 DIAGNOSIS — N60.01 BREAST CYST, RIGHT: ICD-10-CM

## 2023-12-18 PROCEDURE — 77063 BREAST TOMOSYNTHESIS BI: CPT

## 2023-12-22 ENCOUNTER — HOSPITAL ENCOUNTER (OUTPATIENT)
Dept: RADIOLOGY | Facility: HOSPITAL | Age: 43
Discharge: HOME | End: 2023-12-22
Payer: COMMERCIAL

## 2023-12-22 DIAGNOSIS — R92.8 OTHER ABNORMAL AND INCONCLUSIVE FINDINGS ON DIAGNOSTIC IMAGING OF BREAST: ICD-10-CM

## 2023-12-22 PROCEDURE — 76983 USE EA ADDL TARGET LESION: CPT | Mod: 50,59

## 2023-12-22 PROCEDURE — 76642 ULTRASOUND BREAST LIMITED: CPT | Mod: 50,59

## 2024-07-08 ENCOUNTER — LAB REQUISITION (OUTPATIENT)
Dept: LAB | Facility: HOSPITAL | Age: 44
End: 2024-07-08
Payer: COMMERCIAL

## 2024-07-08 DIAGNOSIS — N97.9 FEMALE INFERTILITY, UNSPECIFIED: ICD-10-CM

## 2024-07-08 PROCEDURE — 83002 ASSAY OF GONADOTROPIN (LH): CPT

## 2024-07-08 PROCEDURE — 84144 ASSAY OF PROGESTERONE: CPT

## 2024-07-08 PROCEDURE — 83001 ASSAY OF GONADOTROPIN (FSH): CPT

## 2024-07-08 PROCEDURE — 82670 ASSAY OF TOTAL ESTRADIOL: CPT

## 2024-07-09 LAB
ESTRADIOL SERPL-MCNC: 71 PG/ML
FSH SERPL-ACNC: 4.7 IU/L
LH SERPL-ACNC: 12.8 IU/L
PROGEST SERPL-MCNC: 0.3 NG/ML

## 2024-07-12 ENCOUNTER — LAB REQUISITION (OUTPATIENT)
Dept: LAB | Facility: HOSPITAL | Age: 44
End: 2024-07-12
Payer: COMMERCIAL

## 2024-07-12 DIAGNOSIS — N97.9 FEMALE INFERTILITY, UNSPECIFIED: ICD-10-CM

## 2024-07-12 LAB
ESTRADIOL SERPL-MCNC: 30 PG/ML
LH SERPL-ACNC: 17.7 IU/L
PROGEST SERPL-MCNC: 0.4 NG/ML

## 2024-07-12 PROCEDURE — 84144 ASSAY OF PROGESTERONE: CPT

## 2024-07-12 PROCEDURE — 83002 ASSAY OF GONADOTROPIN (LH): CPT

## 2024-07-12 PROCEDURE — 82670 ASSAY OF TOTAL ESTRADIOL: CPT

## 2024-07-14 ENCOUNTER — LAB REQUISITION (OUTPATIENT)
Dept: LAB | Facility: HOSPITAL | Age: 44
End: 2024-07-14
Payer: COMMERCIAL

## 2024-07-14 DIAGNOSIS — N97.9 FEMALE INFERTILITY, UNSPECIFIED: ICD-10-CM

## 2024-07-14 PROCEDURE — 84144 ASSAY OF PROGESTERONE: CPT

## 2024-07-14 PROCEDURE — 83002 ASSAY OF GONADOTROPIN (LH): CPT

## 2024-07-14 PROCEDURE — 82670 ASSAY OF TOTAL ESTRADIOL: CPT

## 2024-07-15 ENCOUNTER — LAB REQUISITION (OUTPATIENT)
Dept: LAB | Facility: HOSPITAL | Age: 44
End: 2024-07-15
Payer: COMMERCIAL

## 2024-07-15 DIAGNOSIS — Z31.83 ENCOUNTER FOR ASSISTED REPRODUCTIVE FERTILITY PROCEDURE CYCLE: ICD-10-CM

## 2024-07-15 DIAGNOSIS — N97.9 FEMALE INFERTILITY, UNSPECIFIED: ICD-10-CM

## 2024-07-15 LAB
ESTRADIOL SERPL-MCNC: 125 PG/ML
ESTRADIOL SERPL-MCNC: 86 PG/ML
HOLD SPECIMEN: NORMAL
LH SERPL-ACNC: 15.3 IU/L
LH SERPL-ACNC: 15.8 IU/L
PROGEST SERPL-MCNC: 0.4 NG/ML
PROGEST SERPL-MCNC: <0.3 NG/ML

## 2024-07-15 PROCEDURE — 84144 ASSAY OF PROGESTERONE: CPT

## 2024-07-15 PROCEDURE — 82670 ASSAY OF TOTAL ESTRADIOL: CPT

## 2024-07-15 PROCEDURE — 83002 ASSAY OF GONADOTROPIN (LH): CPT

## 2024-07-16 ENCOUNTER — SPECIALTY PHARMACY (OUTPATIENT)
Dept: PHARMACY | Facility: CLINIC | Age: 44
End: 2024-07-16

## 2024-07-17 ENCOUNTER — LAB REQUISITION (OUTPATIENT)
Dept: LAB | Facility: HOSPITAL | Age: 44
End: 2024-07-17
Payer: COMMERCIAL

## 2024-07-17 DIAGNOSIS — Z31.83 ENCOUNTER FOR ASSISTED REPRODUCTIVE FERTILITY PROCEDURE CYCLE: ICD-10-CM

## 2024-07-17 DIAGNOSIS — N97.9 FEMALE INFERTILITY, UNSPECIFIED: ICD-10-CM

## 2024-07-17 LAB
ESTRADIOL SERPL-MCNC: 271 PG/ML
HOLD SPECIMEN: NORMAL
LH SERPL-ACNC: 5.2 IU/L
PROGEST SERPL-MCNC: 0.4 NG/ML

## 2024-07-17 PROCEDURE — 82670 ASSAY OF TOTAL ESTRADIOL: CPT

## 2024-07-17 PROCEDURE — 83002 ASSAY OF GONADOTROPIN (LH): CPT

## 2024-07-17 PROCEDURE — 84144 ASSAY OF PROGESTERONE: CPT

## 2024-11-13 ENCOUNTER — LAB REQUISITION (OUTPATIENT)
Dept: LAB | Facility: HOSPITAL | Age: 44
End: 2024-11-13
Payer: COMMERCIAL

## 2024-11-13 DIAGNOSIS — N97.9 FEMALE INFERTILITY, UNSPECIFIED: ICD-10-CM

## 2024-11-13 LAB
ESTRADIOL SERPL-MCNC: 66 PG/ML
HOLD SPECIMEN: NORMAL
LH SERPL-ACNC: 13.7 IU/L
PROGEST SERPL-MCNC: 0.3 NG/ML

## 2024-11-13 PROCEDURE — 84144 ASSAY OF PROGESTERONE: CPT

## 2024-11-13 PROCEDURE — 83002 ASSAY OF GONADOTROPIN (LH): CPT

## 2024-11-13 PROCEDURE — 82670 ASSAY OF TOTAL ESTRADIOL: CPT

## 2024-11-15 ENCOUNTER — LAB REQUISITION (OUTPATIENT)
Dept: LAB | Facility: HOSPITAL | Age: 44
End: 2024-11-15
Payer: COMMERCIAL

## 2024-11-15 DIAGNOSIS — N97.9 FEMALE INFERTILITY, UNSPECIFIED: ICD-10-CM

## 2024-11-15 PROCEDURE — 84144 ASSAY OF PROGESTERONE: CPT

## 2024-11-15 PROCEDURE — 82670 ASSAY OF TOTAL ESTRADIOL: CPT

## 2024-11-15 PROCEDURE — 83002 ASSAY OF GONADOTROPIN (LH): CPT

## 2024-11-15 NOTE — PROGRESS NOTES
ANNUAL GYNECOLOGIC VISIT     Subjective   HPI:  Tanya Gaffney is a 43 y.o. female  Patient's last menstrual period was 2024 (exact date). for annual exam. She is currently undergoing IVF treatment in [place] with Dr. Moreira.    Complaints:   None  Periods: Regular, occur every 26-28 days, last 4-5 days, goes through 2-4 pads a day.  Dysmenorrhea:  None    GYN History:   Current contraceptive:   Not using, actively trying to become pregnant via IVF  History of abnormal Pap smear:   No  History of abnormal mammogram:    Yes, follow up ultrasounds normal  Family History: Denies family history of breast, colon, vulvar, vaginal, cervical, uterine, or ovarian cancer.    Last Pap Smear:  Result Date Procedure Results Follow-ups Next Due   6/15/2022 CONVERTED GYN CYTOLOGY Negative for intraepithelial lesion or malignancy.            HR HPV neg  2027     Last Mammogram:  23  Impression  Bilateral breast ultrasound is recommended as above.  BI-RADS CATEGORY:  BI-RADS Category:  0 Incomplete; Need Additional Imaging Evaluation  and/or Prior Mammograms for Comparison. Recommendation:  Ultrasound.  Recommended Date:  Immediate.  Laterality:  Bilateral.    Last Colon Cancer Screening:   Cologuard sample provided last week, waiting for results    OB History          0    Para   0    Term   0       0    AB   0    Living   0         SAB   0    IAB   0    Ectopic   0    Multiple   0    Live Births   0                Past Medical History:   Diagnosis Date    Infertility, female     Skin cancer (melanoma) (Multi)     S/p excision     Past Surgical History:   Procedure Laterality Date    NM PET CT MELANOMA  RESTAGING      removed    OTHER SURGICAL HISTORY N/A     2024 AND JULY EGG RETRIVALS    PELVIC LAPAROSCOPY      Diagnostic    POLYPECTOMY         Current Outpatient Medications:     follitropin tam (Gonal-F RFF Redi-Ject) 300/0.5 unit/mL pen injector injection, Inject 300 units  subcutaneoulsy once daily as directed, Disp: 1.5 mL, Rfl: 1    ganirelix (Orgalutran) 250 mcg/0.5 mL syringe injection, INJECT 1 PREFILLED SYRINGE (250 MCG) SUBCUTANEOUSLY DAILY WHEN DIRECTED, Disp: , Rfl:     Menopur 75 unit recon soln injection, INJECT 150 UNITS SUBCUTANEOUSLY ONCE DAILY AS DIRECTED, Disp: , Rfl:     Omnitrope, RECONSTITUTE WITH DILUENT FOLLOWING PACKAGE DIRECTIONS. INJECT 1.25 MG (0.25 ML) SUBCUTANEOUSLY ONCE DAILY WHEN INSTRUCTED, Disp: , Rfl:     vit A-vit C-zinc-propolis (Zinc, with A and C, Lozenges) lozenge, Take by mouth., Disp: , Rfl:     acai/chromium/tea/caffeine/enz (ACAI BERRY DIET ORAL), Take by mouth. 3848-0869 mg daily, Disp: , Rfl:     ascorbic acid (Vitamin C) 1,000 mg tablet, Take 1 tablet (1,000 mg) by mouth once daily., Disp: , Rfl:     co-enzyme Q-10 30 mg capsule, Take 1 capsule (30 mg) by mouth once daily., Disp: , Rfl:     docosahexaenoic acid (PRENATAL DHA ORAL), Take by mouth., Disp: , Rfl:     multivitamin tablet, Take 1 tablet by mouth once daily. Vitamin D wit K , 2,000 mg daily, Disp: , Rfl:     Social History     Tobacco Use    Smoking status: Never    Smokeless tobacco: Never   Vaping Use    Vaping status: Never Used   Substance Use Topics    Alcohol use: Not Currently    Drug use: Never      Objective   /68   Wt 58.2 kg (128 lb 3.2 oz)   LMP 11/08/2024 (Exact Date) Comment: monthly menses, normal blood loss  BMI 20.69 kg/m²     Physical Exam  General: Alert and oriented, in no acute distress.  Psych: Normal affect and mood. Able to answer questions appropriately.   Breast/Axilla: Scar noted under the left breast from previous melanoma excision. Normal to palpation bilaterally without masses, skin changes, or nipple discharge. No palpable supraclavicular or axillary lymphadenopathy.   Heart: Regular rate.  Lungs: Non labored respirations.  Abdomen: Soft, non-tender, without masses or organomegaly.  GYN:  Speculum:  Vulva: Normal architecture without  erythema, masses, or lesions.   Vagina: Normal moist mucosa without lesions, masses, or atrophy. No abnormal vaginal discharge.   Cervix: Normal without erythema, masses, lesions, or signs of cervicitis.  Bimanual:   Cervix: No cervical motion tenderness.  Uterus: Normal, mobile, non-enlarged, non tender uterus.  Adnexa: Normal without tenderness, nodularity, masses, or lesions.      Assessment/Plan     43 y.o. , here for annual GYN examination  Assessment & Plan  Encounter for annual routine gynecological examination  - Routine annual  - Discussed OB/GYN Preventive Measures:     - Pap smear indicated every 3-5 years if normal and otherwise low risk   - Self breast exam monthly and clinical breast examination/mammogram yearly   - Screening colonoscopy recommended starting age 45, then Q3-10 years depending on testing and family history   - Genitourinary skin hygiene discussed  - Diet/Weight management discussed  - Exercise 30-60 minutes 3-5 times/week  - Patient to return in 1 year for annual exam or sooner as clinically indicated        Screening mammogram for breast cancer    Orders:    BI mammo bilateral screening tomosynthesis; Future      Geneva Barnhart MD

## 2024-11-16 LAB
ESTRADIOL SERPL-MCNC: 150 PG/ML
LH SERPL-ACNC: 7.8 IU/L
PROGEST SERPL-MCNC: 0.3 NG/ML

## 2024-11-18 ENCOUNTER — LAB REQUISITION (OUTPATIENT)
Dept: LAB | Facility: HOSPITAL | Age: 44
End: 2024-11-18
Payer: COMMERCIAL

## 2024-11-18 ENCOUNTER — OFFICE VISIT (OUTPATIENT)
Dept: OBSTETRICS AND GYNECOLOGY | Facility: CLINIC | Age: 44
End: 2024-11-18
Payer: COMMERCIAL

## 2024-11-18 VITALS — BODY MASS INDEX: 20.69 KG/M2 | WEIGHT: 128.2 LBS | SYSTOLIC BLOOD PRESSURE: 114 MMHG | DIASTOLIC BLOOD PRESSURE: 68 MMHG

## 2024-11-18 DIAGNOSIS — Z12.31 SCREENING MAMMOGRAM FOR BREAST CANCER: ICD-10-CM

## 2024-11-18 DIAGNOSIS — Z31.83 ENCOUNTER FOR ASSISTED REPRODUCTIVE FERTILITY PROCEDURE CYCLE: ICD-10-CM

## 2024-11-18 DIAGNOSIS — Z01.419 ENCOUNTER FOR ANNUAL ROUTINE GYNECOLOGICAL EXAMINATION: Primary | ICD-10-CM

## 2024-11-18 DIAGNOSIS — N97.9 FEMALE INFERTILITY, UNSPECIFIED: ICD-10-CM

## 2024-11-18 LAB
ESTRADIOL SERPL-MCNC: 493 PG/ML
HOLD SPECIMEN: NORMAL
HOLD SPECIMEN: NORMAL
LH SERPL-ACNC: 5.1 IU/L
PROGEST SERPL-MCNC: 0.4 NG/ML

## 2024-11-18 PROCEDURE — 1036F TOBACCO NON-USER: CPT | Performed by: STUDENT IN AN ORGANIZED HEALTH CARE EDUCATION/TRAINING PROGRAM

## 2024-11-18 PROCEDURE — 99396 PREV VISIT EST AGE 40-64: CPT | Performed by: STUDENT IN AN ORGANIZED HEALTH CARE EDUCATION/TRAINING PROGRAM

## 2024-11-18 PROCEDURE — 84144 ASSAY OF PROGESTERONE: CPT

## 2024-11-18 PROCEDURE — 82670 ASSAY OF TOTAL ESTRADIOL: CPT

## 2024-11-18 PROCEDURE — 83002 ASSAY OF GONADOTROPIN (LH): CPT

## 2024-11-18 PROCEDURE — 83001 ASSAY OF GONADOTROPIN (FSH): CPT

## 2024-11-18 RX ORDER — GANIRELIX ACETATE 250 UG/.5ML
INJECTION, SOLUTION SUBCUTANEOUS
COMMUNITY
Start: 2024-10-29

## 2024-11-18 RX ORDER — MENOTROPINS 75 UNIT
KIT SUBCUTANEOUS
COMMUNITY
Start: 2024-07-16

## 2024-11-18 RX ORDER — BISMUTH SUBSALICYLATE 262 MG
1 TABLET,CHEWABLE ORAL DAILY
COMMUNITY

## 2024-11-18 RX ORDER — SOMATROPIN 5.8 MG
KIT SUBCUTANEOUS
COMMUNITY
Start: 2024-10-29

## 2024-11-18 RX ORDER — VIT A ACET/VIT C/ZINC/PROPOLIS
LOZENGE ORAL
COMMUNITY
Start: 2023-02-23

## 2024-11-18 RX ORDER — UBIDECARENONE 30 MG
30 CAPSULE ORAL DAILY
COMMUNITY

## 2024-11-18 RX ORDER — IBUPROFEN 100 MG/5ML
1000 SUSPENSION, ORAL (FINAL DOSE FORM) ORAL DAILY
COMMUNITY

## 2024-11-18 ASSESSMENT — LIFESTYLE VARIABLES: HOW MANY STANDARD DRINKS CONTAINING ALCOHOL DO YOU HAVE ON A TYPICAL DAY: PATIENT DOES NOT DRINK

## 2024-11-18 ASSESSMENT — ENCOUNTER SYMPTOMS
DEPRESSION: 0
LOSS OF SENSATION IN FEET: 0
OCCASIONAL FEELINGS OF UNSTEADINESS: 0

## 2024-11-18 ASSESSMENT — PATIENT HEALTH QUESTIONNAIRE - PHQ9
SUM OF ALL RESPONSES TO PHQ9 QUESTIONS 1 & 2: 0
2. FEELING DOWN, DEPRESSED OR HOPELESS: NOT AT ALL
1. LITTLE INTEREST OR PLEASURE IN DOING THINGS: NOT AT ALL

## 2024-11-18 ASSESSMENT — PAIN SCALES - GENERAL: PAINLEVEL_OUTOF10: 0-NO PAIN

## 2024-11-19 ENCOUNTER — LAB REQUISITION (OUTPATIENT)
Dept: LAB | Facility: HOSPITAL | Age: 44
End: 2024-11-19
Payer: COMMERCIAL

## 2024-11-19 DIAGNOSIS — N97.9 FEMALE INFERTILITY, UNSPECIFIED: ICD-10-CM

## 2024-11-19 LAB
ESTRADIOL SERPL-MCNC: 747 PG/ML
FSH SERPL-ACNC: 19.6 IU/L
HOLD SPECIMEN: NORMAL
LH SERPL-ACNC: 25.9 IU/L
PROGEST SERPL-MCNC: 0.8 NG/ML

## 2024-11-20 ENCOUNTER — LAB REQUISITION (OUTPATIENT)
Dept: LAB | Facility: HOSPITAL | Age: 44
End: 2024-11-20
Payer: COMMERCIAL

## 2024-11-20 DIAGNOSIS — N97.9 FEMALE INFERTILITY, UNSPECIFIED: ICD-10-CM

## 2024-11-20 LAB
HOLD SPECIMEN: NORMAL
LH SERPL-ACNC: 47.6 IU/L
PROGEST SERPL-MCNC: 1.7 NG/ML

## 2024-11-20 PROCEDURE — 83002 ASSAY OF GONADOTROPIN (LH): CPT

## 2024-11-20 PROCEDURE — 84144 ASSAY OF PROGESTERONE: CPT

## 2024-12-19 ENCOUNTER — HOSPITAL ENCOUNTER (OUTPATIENT)
Dept: RADIOLOGY | Facility: CLINIC | Age: 44
Discharge: HOME | End: 2024-12-19
Payer: COMMERCIAL

## 2024-12-19 VITALS — WEIGHT: 125 LBS | BODY MASS INDEX: 20.09 KG/M2 | HEIGHT: 66 IN

## 2024-12-19 DIAGNOSIS — Z12.31 SCREENING MAMMOGRAM FOR BREAST CANCER: ICD-10-CM

## 2024-12-19 PROCEDURE — 77067 SCR MAMMO BI INCL CAD: CPT | Performed by: RADIOLOGY

## 2024-12-19 PROCEDURE — 77067 SCR MAMMO BI INCL CAD: CPT

## 2024-12-19 PROCEDURE — 77063 BREAST TOMOSYNTHESIS BI: CPT | Performed by: RADIOLOGY
